# Patient Record
Sex: FEMALE | Race: BLACK OR AFRICAN AMERICAN | Employment: UNEMPLOYED | URBAN - METROPOLITAN AREA
[De-identification: names, ages, dates, MRNs, and addresses within clinical notes are randomized per-mention and may not be internally consistent; named-entity substitution may affect disease eponyms.]

---

## 2017-10-20 ENCOUNTER — HOSPITAL ENCOUNTER (OUTPATIENT)
Dept: LAB | Age: 62
Discharge: HOME | End: 2017-10-20

## 2017-10-20 ENCOUNTER — OFFICE VISIT (OUTPATIENT)
Dept: FAMILY MEDICINE CLINIC | Age: 62
End: 2017-10-20

## 2017-10-20 VITALS
WEIGHT: 181 LBS | TEMPERATURE: 98.9 F | HEIGHT: 60 IN | DIASTOLIC BLOOD PRESSURE: 80 MMHG | HEART RATE: 78 BPM | SYSTOLIC BLOOD PRESSURE: 145 MMHG | BODY MASS INDEX: 35.53 KG/M2

## 2017-10-20 DIAGNOSIS — E66.3 OVERWEIGHT: ICD-10-CM

## 2017-10-20 DIAGNOSIS — Z13.220 SCREENING FOR CHOLESTEROL LEVEL: ICD-10-CM

## 2017-10-20 DIAGNOSIS — Z23 ENCOUNTER FOR IMMUNIZATION: ICD-10-CM

## 2017-10-20 DIAGNOSIS — M54.5 BILATERAL LOW BACK PAIN, UNSPECIFIED CHRONICITY, WITH SCIATICA PRESENCE UNSPECIFIED: Primary | ICD-10-CM

## 2017-10-20 LAB
ALBUMIN SERPL-MCNC: 3.8 G/DL (ref 3.5–5)
ALBUMIN/GLOB SERPL: 1 {RATIO} (ref 1.1–2.2)
ALP SERPL-CCNC: 148 U/L (ref 45–117)
ALT SERPL-CCNC: 52 U/L (ref 12–78)
ANION GAP SERPL CALC-SCNC: 6 MMOL/L (ref 5–15)
AST SERPL-CCNC: 38 U/L (ref 15–37)
BILIRUB SERPL-MCNC: 0.5 MG/DL (ref 0.2–1)
BUN SERPL-MCNC: 9 MG/DL (ref 6–20)
BUN/CREAT SERPL: 14 (ref 12–20)
CALCIUM SERPL-MCNC: 10.8 MG/DL (ref 8.5–10.1)
CHLORIDE SERPL-SCNC: 106 MMOL/L (ref 97–108)
CHOLEST SERPL-MCNC: 307 MG/DL
CO2 SERPL-SCNC: 28 MMOL/L (ref 21–32)
CREAT SERPL-MCNC: 0.64 MG/DL (ref 0.55–1.02)
EST. AVERAGE GLUCOSE BLD GHB EST-MCNC: 171 MG/DL
GLOBULIN SER CALC-MCNC: 3.9 G/DL (ref 2–4)
GLUCOSE SERPL-MCNC: 156 MG/DL (ref 65–100)
HBA1C MFR BLD: 7.6 % (ref 4.2–6.3)
HDLC SERPL-MCNC: 54 MG/DL
HDLC SERPL: 5.7 {RATIO} (ref 0–5)
LDLC SERPL CALC-MCNC: 220.8 MG/DL (ref 0–100)
LIPID PROFILE,FLP: ABNORMAL
POTASSIUM SERPL-SCNC: 4.1 MMOL/L (ref 3.5–5.1)
PROT SERPL-MCNC: 7.7 G/DL (ref 6.4–8.2)
SODIUM SERPL-SCNC: 140 MMOL/L (ref 136–145)
TRIGL SERPL-MCNC: 161 MG/DL (ref ?–150)
TSH SERPL DL<=0.05 MIU/L-ACNC: 1.86 UIU/ML (ref 0.36–3.74)
VLDLC SERPL CALC-MCNC: 32.2 MG/DL

## 2017-10-20 PROCEDURE — 80061 LIPID PANEL: CPT | Performed by: PHYSICIAN ASSISTANT

## 2017-10-20 PROCEDURE — 84443 ASSAY THYROID STIM HORMONE: CPT | Performed by: PHYSICIAN ASSISTANT

## 2017-10-20 PROCEDURE — 83036 HEMOGLOBIN GLYCOSYLATED A1C: CPT | Performed by: PHYSICIAN ASSISTANT

## 2017-10-20 PROCEDURE — 80053 COMPREHEN METABOLIC PANEL: CPT | Performed by: PHYSICIAN ASSISTANT

## 2017-10-20 NOTE — PATIENT INSTRUCTIONS
Back Pain: Care Instructions  Your Care Instructions    Back pain has many possible causes. It is often related to problems with muscles and ligaments of the back. It may also be related to problems with the nerves, discs, or bones of the back. Moving, lifting, standing, sitting, or sleeping in an awkward way can strain the back. Sometimes you don't notice the injury until later. Arthritis is another common cause of back pain. Although it may hurt a lot, back pain usually improves on its own within several weeks. Most people recover in 12 weeks or less. Using good home treatment and being careful not to stress your back can help you feel better sooner. Follow-up care is a key part of your treatment and safety. Be sure to make and go to all appointments, and call your doctor if you are having problems. Its also a good idea to know your test results and keep a list of the medicines you take. How can you care for yourself at home? · Sit or lie in positions that are most comfortable and reduce your pain. Try one of these positions when you lie down:  ¨ Lie on your back with your knees bent and supported by large pillows. ¨ Lie on the floor with your legs on the seat of a sofa or chair. Josephus Phlegm on your side with your knees and hips bent and a pillow between your legs. ¨ Lie on your stomach if it does not make pain worse. · Do not sit up in bed, and avoid soft couches and twisted positions. Bed rest can help relieve pain at first, but it delays healing. Avoid bed rest after the first day of back pain. · Change positions every 30 minutes. If you must sit for long periods of time, take breaks from sitting. Get up and walk around, or lie in a comfortable position. · Try using a heating pad on a low or medium setting for 15 to 20 minutes every 2 or 3 hours. Try a warm shower in place of one session with the heating pad. · You can also try an ice pack for 10 to 15 minutes every 2 to 3 hours.  Put a thin cloth between the ice pack and your skin. · Take pain medicines exactly as directed. ¨ If the doctor gave you a prescription medicine for pain, take it as prescribed. ¨ If you are not taking a prescription pain medicine, ask your doctor if you can take an over-the-counter medicine. · Take short walks several times a day. You can start with 5 to 10 minutes, 3 or 4 times a day, and work up to longer walks. Walk on level surfaces and avoid hills and stairs until your back is better. · Return to work and other activities as soon as you can. Continued rest without activity is usually not good for your back. · To prevent future back pain, do exercises to stretch and strengthen your back and stomach. Learn how to use good posture, safe lifting techniques, and proper body mechanics. When should you call for help? Call your doctor now or seek immediate medical care if:  · You have new or worsening numbness in your legs. · You have new or worsening weakness in your legs. (This could make it hard to stand up.)  · You lose control of your bladder or bowels. Watch closely for changes in your health, and be sure to contact your doctor if:  · Your pain gets worse. · You are not getting better after 2 weeks. Where can you learn more? Go to http://april-judson.info/. Enter H661 in the search box to learn more about \"Back Pain: Care Instructions. \"  Current as of: March 21, 2017  Content Version: 11.3  © 6155-0122 [employer]. Care instructions adapted under license by [employer] (which disclaims liability or warranty for this information). If you have questions about a medical condition or this instruction, always ask your healthcare professional. [address] any warranty or liability for your use of this information. Back Stretches: Exercises  Your Care Instructions  Here are some examples of exercises for stretching your back.  Start each exercise slowly. Ease off the exercise if you start to have pain. Your doctor or physical therapist will tell you when you can start these exercises and which ones will work best for you. How to do the exercises  Overhead stretch    1. Stand comfortably with your feet shoulder-width apart. 2. Looking straight ahead, raise both arms over your head and reach toward the ceiling. Do not allow your head to tilt back. 3. Hold for 15 to 30 seconds, then lower your arms to your sides. 4. Repeat 2 to 4 times. Side stretch    1. Stand comfortably with your feet shoulder-width apart. 2. Raise one arm over your head, and then lean to the other side. 3. Slide your hand down your leg as you let the weight of your arm gently stretch your side muscles. Hold for 15 to 30 seconds. 4. Repeat 2 to 4 times on each side. Press-up    1. Lie on your stomach, supporting your body with your forearms. 2. Press your elbows down into the floor to raise your upper back. As you do this, relax your stomach muscles and allow your back to arch without using your back muscles. As your press up, do not let your hips or pelvis come off the floor. 3. Hold for 15 to 30 seconds, then relax. 4. Repeat 2 to 4 times. Relax and rest    1. Lie on your back with a rolled towel under your neck and a pillow under your knees. Extend your arms comfortably to your sides. 2. Relax and breathe normally. 3. Remain in this position for about 10 minutes. 4. If you can, do this 2 or 3 times each day. Follow-up care is a key part of your treatment and safety. Be sure to make and go to all appointments, and call your doctor if you are having problems. It's also a good idea to know your test results and keep a list of the medicines you take. Where can you learn more? Go to http://april-judson.info/. Enter R110 in the search box to learn more about \"Back Stretches: Exercises. \"  Current as of: March 21, 2017  Content Version: 11.3  © 8025-1331 [employer]. Care instructions adapted under license by [employer] (which disclaims liability or warranty for this information). If you have questions about a medical condition or this instruction, always ask your healthcare professional. [address] any warranty or liability for your use of this information.

## 2017-10-20 NOTE — MR AVS SNAPSHOT
Visit Information Date & Time Provider Department Dept. Phone Encounter #  
 10/20/2017  9:30 AM Kaushik Malhotra 89, [place] [facility] Marna Fabry [phone] [id number] Follow-up Instructions Return in about 2 months (around 12/20/2017), or if symptoms worsen or fail to improve. Upcoming Health Maintenance Date Due DTaP/Tdap/Td series (1 - Tdap) 1/9/1975 FOBT Q 1 YEAR AGE 50-75 1/9/2004 ZOSTER VACCINE AGE 60> 11/9/2013 INFLUENZA AGE 9 TO ADULT 8/1/2017 BREAST CANCER SCRN MAMMOGRAM 10/20/2019 PAP AKA CERVICAL CYTOLOGY 10/20/2020 Allergies as of 10/20/2017  Review Complete On: 10/20/2017 By: Geovanni Beyer No Known Allergies Current Immunizations  Never Reviewed No immunizations on file. Not reviewed this visit You Were Diagnosed With   
  
 Codes Comments Bilateral low back pain, unspecified chronicity, with sciatica presence unspecified    -  Primary ICD-10-CM: M54.5 ICD-9-CM: 724.2 Overweight     ICD-10-CM: N92.4 ICD-9-CM: 278.02 Screening for cholesterol level     ICD-10-CM: Z13.220 ICD-9-CM: V77.91 Vitals BP Pulse Temp Height(growth percentile) Weight(growth percentile) BMI  
 145/80 (BP 1 Location: Right arm, BP Patient Position: Sitting) 78 99.1 °F (37.3 °C) (Oral) 5' 0.04\" (1.525 m) 181 lb (82.1 kg) 35.3 kg/m2 OB Status Smoking Status Postmenopausal Never Smoker Vitals History BMI and BSA Data Body Mass Index Body Surface Area  
 35.3 kg/m 2 1.86 m 2 Your Updated Medication List  
  
Notice  As of 10/20/2017 10:39 AM  
 You have not been prescribed any medications. We Performed the Following TN COLLECTION VENOUS BLOOD,VENIPUNCTURE [id number] CPT(R)] Follow-up Instructions Return in about 2 months (around 12/20/2017), or if symptoms worsen or fail to improve. To-Do List   
 10/20/2017   Imaging:  XR SPINE LUMB 2 OR 3 V   
  
  
 Patient Instructions Back Pain: Care Instructions Your Care Instructions Back pain has many possible causes. It is often related to problems with muscles and ligaments of the back. It may also be related to problems with the nerves, discs, or bones of the back. Moving, lifting, standing, sitting, or sleeping in an awkward way can strain the back. Sometimes you don't notice the injury until later. Arthritis is another common cause of back pain. Although it may hurt a lot, back pain usually improves on its own within several weeks. Most people recover in 12 weeks or less. Using good home treatment and being careful not to stress your back can help you feel better sooner. Follow-up care is a key part of your treatment and safety. Be sure to make and go to all appointments, and call your doctor if you are having problems. Its also a good idea to know your test results and keep a list of the medicines you take. How can you care for yourself at home? · Sit or lie in positions that are most comfortable and reduce your pain. Try one of these positions when you lie down: ¨ Lie on your back with your knees bent and supported by large pillows. ¨ Lie on the floor with your legs on the seat of a sofa or chair. Duayne Lark on your side with your knees and hips bent and a pillow between your legs. ¨ Lie on your stomach if it does not make pain worse. · Do not sit up in bed, and avoid soft couches and twisted positions. Bed rest can help relieve pain at first, but it delays healing. Avoid bed rest after the first day of back pain. · Change positions every 30 minutes. If you must sit for long periods of time, take breaks from sitting. Get up and walk around, or lie in a comfortable position. · Try using a heating pad on a low or medium setting for 15 to 20 minutes every 2 or 3 hours. Try a warm shower in place of one session with the heating pad. · You can also try an ice pack for 10 to 15 minutes every 2 to 3 hours. Put a thin cloth between the ice pack and your skin. · Take pain medicines exactly as directed. ¨ If the doctor gave you a prescription medicine for pain, take it as prescribed. ¨ If you are not taking a prescription pain medicine, ask your doctor if you can take an over-the-counter medicine. · Take short walks several times a day. You can start with 5 to 10 minutes, 3 or 4 times a day, and work up to longer walks. Walk on level surfaces and avoid hills and stairs until your back is better. · Return to work and other activities as soon as you can. Continued rest without activity is usually not good for your back. · To prevent future back pain, do exercises to stretch and strengthen your back and stomach. Learn how to use good posture, safe lifting techniques, and proper body mechanics. When should you call for help? Call your doctor now or seek immediate medical care if: 
· You have new or worsening numbness in your legs. · You have new or worsening weakness in your legs. (This could make it hard to stand up.) · You lose control of your bladder or bowels. Watch closely for changes in your health, and be sure to contact your doctor if: 
· Your pain gets worse. · You are not getting better after 2 weeks. Where can you learn more? Go to http://april-judson.info/. Enter Q022 in the search box to learn more about \"Back Pain: Care Instructions. \" Current as of: March 21, 2017 Content Version: 11.3 © 2150-8046 [employer]. Care instructions adapted under license by [employer] (which disclaims liability or warranty for this information). If you have questions about a medical condition or this instruction, always ask your healthcare professional. [address] any warranty or liability for your use of this information. Back Stretches: Exercises Your Care Instructions Here are some examples of exercises for stretching your back. Start each exercise slowly. Ease off the exercise if you start to have pain. Your doctor or physical therapist will tell you when you can start these exercises and which ones will work best for you. How to do the exercises Overhead stretch 1. Stand comfortably with your feet shoulder-width apart. 2. Looking straight ahead, raise both arms over your head and reach toward the ceiling. Do not allow your head to tilt back. 3. Hold for 15 to 30 seconds, then lower your arms to your sides. 4. Repeat 2 to 4 times. Side stretch 1. Stand comfortably with your feet shoulder-width apart. 2. Raise one arm over your head, and then lean to the other side. 3. Slide your hand down your leg as you let the weight of your arm gently stretch your side muscles. Hold for 15 to 30 seconds. 4. Repeat 2 to 4 times on each side. Press-up 1. Lie on your stomach, supporting your body with your forearms. 2. Press your elbows down into the floor to raise your upper back. As you do this, relax your stomach muscles and allow your back to arch without using your back muscles. As your press up, do not let your hips or pelvis come off the floor. 3. Hold for 15 to 30 seconds, then relax. 4. Repeat 2 to 4 times. Relax and rest 
 
1. Lie on your back with a rolled towel under your neck and a pillow under your knees. Extend your arms comfortably to your sides. 2. Relax and breathe normally. 3. Remain in this position for about 10 minutes. 4. If you can, do this 2 or 3 times each day. Follow-up care is a key part of your treatment and safety. Be sure to make and go to all appointments, and call your doctor if you are having problems. It's also a good idea to know your test results and keep a list of the medicines you take. Where can you learn more? Go to http://april-judson.info/. Enter L651 in the search box to learn more about \"Back Stretches: Exercises. \" Current as of: March 21, 2017 Content Version: 11.3 © 7992-9070 [employer]. Care instructions adapted under license by [employer] (which disclaims liability or warranty for this information). If you have questions about a medical condition or this instruction, always ask your healthcare professional. [address] any warranty or liability for your use of this information. Introducing [facility]! [employer] introduces Affectv patient portal. Now you can access parts of your medical record, email your doctor's office, and request medication refills online. 1. In your internet browser, go to [email or web address] 2. Click on the First Time User? Click Here link in the Sign In box. You will see the New Member Sign Up page. 3. Enter your Affectv Access Code exactly as it appears below. You will not need to use this code after youve completed the sign-up process. If you do not sign up before the expiration date, you must request a new code. · Affectv Access Code: [email or web address] Expires: 1/18/2018 10:07 AM 
 
4. Enter the last four digits of your Social Security Number (xxxx) and Date of Birth (mm/dd/yyyy) as indicated and click Submit. You will be taken to the next sign-up page. 5. Create a Affectv ID. This will be your Affectv login ID and cannot be changed, so think of one that is secure and easy to remember. 6. Create a Affectv password. You can change your password at any time. 7. Enter your Password Reset Question and Answer. This can be used at a later time if you forget your password. 8. Enter your e-mail address. You will receive e-mail notification when new information is available in [address] 9. Click Sign Up. You can now view and download portions of your medical record. 10. Click the Download Summary menu link to download a portable copy of your medical information. If you have questions, please visit the Frequently Asked Questions section of the StormPins website. Remember, StormPins is NOT to be used for urgent needs. For medical emergencies, dial 911. Now available from your iPhone and Android! Please provide this summary of care documentation to your next provider. If you have any questions after today's visit, please call [phone].

## 2017-10-20 NOTE — PROGRESS NOTES
Assessment/Plan:    Diagnoses and all orders for this visit:    1. Bilateral low back pain, unspecified chronicity, with sciatica presence unspecified  -     XR SPINE LUMB 2 OR 3 V; Future  -     MO COLLECTION VENOUS BLOOD,VENIPUNCTURE    2. Overweight  -     HEMOGLOBIN A1C WITH EAG; Future  -     TSH 3RD GENERATION; Future  -     METABOLIC PANEL, COMPREHENSIVE; Future    3. Screening for cholesterol level  -     LIPID PANEL; Future        Follow-up Disposition:  Return in about 2 months (around 12/20/2017), or if symptoms worsen or fail to improve. CHIKIS Ledezma expressed understanding of this plan. An AVS was printed and given to the patient.      ----------------------------------------------------------------------    Chief Complaint   Patient presents with    Physical     General Exam  +  Blood work       History of Present Illness:  61 yo here for first appt. She has a problem with julius low back pain. Worse with standing and walking and pain is decreased with bending at the waist forward. She states that she has gained a lot of weight since moving to the [address] [place] [address] from [facility] 2 years ago. She thinks that the problem is that she is not working, hasn't been able to find a job. She has no chronic medical problems  She is not on any current medications  She has no known allergies      No past medical history on file. No Known Allergies    Social History   Substance Use Topics    Smoking status: Never Smoker    Smokeless tobacco: Not on file    Alcohol use No       No family history on file. Physical Exam:     Visit Vitals    /80 (BP 1 Location: Right arm, BP Patient Position: Sitting)    Pulse 78    Temp 98.9 °F (37.2 °C) (Oral)    Ht 5' 0.04\" (1.525 m)    Wt 181 lb (82.1 kg)    BMI 35.3 kg/m2   gen pleasant, looks well    A&Ox3  WDWN NAD  Respirations normal and non labored  MSK exam- tender julius paraspinal muscles lumbar spine. SLR neg julius.  She has general in flexibility due to no exercise. She has relief of pain with she has forward bend at waist. Neuro intact down to toes.  We went over some gentle exercises to help her back which she states made her feel better

## 2017-10-20 NOTE — PROGRESS NOTES
No need to use . AVS given and reviewed. [facility] resource sheet given and instructed patient to call. Information on how to get x-ray and where to go discussed with patient. Care card information sheet given and discussed with patient. Pt aware that she must return to see the health provider in 2 months for follow up or sooner  or if symptoms worsen or fail to improve. No allergies to medications or foods, and no adverse reactions to previous vaccines per patient. Influenza vaccine given per protocol, schedule and provider order. Vaccine given without complications Entered in [employer]. VIS statement given and reviewed. Potential side effects reviewed. Reviewed reasons to seek emergency assistance. Patient verbalized understanding. Patient waited in the clinic for 15 minutes, patient did not show s/s of allergic reaction at time of discharge. Directed patient to registration to make f/u appt. Patient verbalized understanding . No questions or concern from patient at this time.  Lanny Ferreira RN

## 2017-10-23 DIAGNOSIS — E11.9 DM TYPE 2, GOAL HBA1C < 7% (HCC): Primary | ICD-10-CM

## 2017-10-23 NOTE — PROGRESS NOTES
I spoke to the pt on the phone at length about her new DM dx. She will start with lifestyle changes - diet and increase her activity. Please send her some info in English about Diabetes. [facility], please schedule her next available with dietician.  Thank you team!

## 2017-11-08 ENCOUNTER — OFFICE VISIT (OUTPATIENT)
Dept: FAMILY MEDICINE CLINIC | Age: 62
End: 2017-11-08

## 2017-11-08 DIAGNOSIS — Z71.3 DIETARY COUNSELING AND SURVEILLANCE: Primary | ICD-10-CM

## 2017-11-08 NOTE — PROGRESS NOTES
Nutrition Initial Visit    Muna Awan was seen for nutrition education relating to T2DM. Pt A1C 10/20/17- 7.6. Explained that A1C reflects blood sugar over a three-month period and is an indication of blood sugar control over time. Pt reports typically eating 2x/day. AM  Cream of wheat w/ splenda and 2% milk    Mid Day  Vegetables (broccoli/collards/cabbage)  Boiled chicken/fish  Sometimes small portion of brown rice    Pt reports drinking mostly water, occasionally orange juice diluted with water. Pt has cut out sweets from her diet. An alternative breakfast includes wheat bread with velasco or an egg. Emphasized the importance of protein at each meal, and snack especially if eating fruit or another carb food snack. Provided Myplate handout and discussed ideal portioning for blood sugar control. Suggested that pt try to eat more frequent meals to keep blood sugar stable throughout the day. Pt reported walking for exercise about 3x/week. Pt says it has helped with her back pain.

## 2017-12-15 ENCOUNTER — OFFICE VISIT (OUTPATIENT)
Dept: FAMILY MEDICINE CLINIC | Age: 62
End: 2017-12-15

## 2017-12-15 VITALS
TEMPERATURE: 98.4 F | HEART RATE: 88 BPM | BODY MASS INDEX: 33.94 KG/M2 | DIASTOLIC BLOOD PRESSURE: 83 MMHG | WEIGHT: 174 LBS | SYSTOLIC BLOOD PRESSURE: 134 MMHG

## 2017-12-15 DIAGNOSIS — E11.9 DM TYPE 2, GOAL HBA1C < 7% (HCC): Primary | ICD-10-CM

## 2017-12-15 LAB — GLUCOSE POC: NORMAL MG/DL

## 2017-12-15 NOTE — PROGRESS NOTES
Assessment/Plan:    Diagnoses and all orders for this visit:    1. DM type 2, goal HbA1c < 7% ([facility])  -     AMB POC GLUCOSE BLOOD, BY GLUCOSE MONITORING DEVICE    Pt is making incredible changes to her lifestyle and feels great  Plan to f/up with labs in 2/18 and appt 2 weeks later    Follow-up Disposition: Not on [address] \[facility] [place] [facility]\"", CHIKIS Barber expressed understanding of this plan. An AVS was printed and given to the patient.      ----------------------------------------------------------------------    Chief Complaint   Patient presents with    Blood sugar problem       History of Present Illness:  Pt here for f/up on recently dx'd DM. She is feeling great and has managed to lose about 7 lbs with diet and exercise changes. She has eliminated all sodas from her diet, all sugary foods and many starchy foods. We reviewed the rec's from dietician about getting more protein with her meals and she is working on this too. 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 No past medical history on file. No Known Allergies    Social History   Substance Use Topics    Smoking status: Never Smoker    Smokeless tobacco: Never Used    Alcohol use No       No family history on file.     Physical Exam:     Visit Vitals    /83 (BP 1 Location: Right arm, BP Patient Position: Sitting)    Pulse 88    Temp 98.4 °F (36.9 °C) (Oral)    Wt 174 lb (78.9 kg)    BMI 33.94 kg/m2       A&Ox3  WDWN NAD  Respirations normal and non labored

## 2018-02-16 ENCOUNTER — HOSPITAL ENCOUNTER (OUTPATIENT)
Dept: LAB | Age: 63
Discharge: HOME | End: 2018-02-16

## 2018-02-16 ENCOUNTER — CLINICAL SUPPORT (OUTPATIENT)
Dept: FAMILY MEDICINE CLINIC | Age: 63
End: 2018-02-16

## 2018-02-16 DIAGNOSIS — E11.9 DM TYPE 2, GOAL HBA1C < 7% (HCC): ICD-10-CM

## 2018-02-16 LAB
EST. AVERAGE GLUCOSE BLD GHB EST-MCNC: 154 MG/DL
HBA1C MFR BLD: 7 % (ref 4.2–6.3)

## 2018-02-16 PROCEDURE — 83036 HEMOGLOBIN GLYCOSYLATED A1C: CPT | Performed by: PHYSICIAN ASSISTANT

## 2018-02-16 NOTE — PROGRESS NOTES
Patient presents for lab draw ordered by: Nazanin Marion    The following labs were drawn Carole Cruz LPN    [id number]    The following tubes were sent:  Lavender  ( 1)      Pt was unable to leave a urine sample today. She will make an appt to drop off urine.

## 2018-03-02 ENCOUNTER — HOSPITAL ENCOUNTER (OUTPATIENT)
Dept: LAB | Age: 63
Discharge: HOME | End: 2018-03-02

## 2018-03-02 ENCOUNTER — OFFICE VISIT (OUTPATIENT)
Dept: FAMILY MEDICINE CLINIC | Age: 63
End: 2018-03-02

## 2018-03-02 DIAGNOSIS — M54.5 BILATERAL LOW BACK PAIN, UNSPECIFIED CHRONICITY, WITH SCIATICA PRESENCE UNSPECIFIED: ICD-10-CM

## 2018-03-02 DIAGNOSIS — E11.9 DM TYPE 2, GOAL HBA1C < 7% (HCC): Primary | ICD-10-CM

## 2018-03-02 LAB
CREAT UR-MCNC: 170 MG/DL
MICROALBUMIN UR-MCNC: 1.48 MG/DL
MICROALBUMIN/CREAT UR-RTO: 9 MG/G (ref 0–30)

## 2018-03-02 PROCEDURE — 82043 UR ALBUMIN QUANTITATIVE: CPT | Performed by: PHYSICIAN ASSISTANT

## 2018-03-02 PROCEDURE — 82570 ASSAY OF URINE CREATININE: CPT | Performed by: PHYSICIAN ASSISTANT

## 2018-04-20 ENCOUNTER — OFFICE VISIT (OUTPATIENT)
Dept: FAMILY MEDICINE CLINIC | Age: 63
End: 2018-04-20

## 2018-04-20 ENCOUNTER — HOSPITAL ENCOUNTER (OUTPATIENT)
Dept: LAB | Age: 63
Discharge: HOME | End: 2018-04-20

## 2018-04-20 VITALS
HEART RATE: 81 BPM | BODY MASS INDEX: 33.81 KG/M2 | TEMPERATURE: 98.5 F | WEIGHT: 172.2 LBS | HEIGHT: 60 IN | SYSTOLIC BLOOD PRESSURE: 149 MMHG | DIASTOLIC BLOOD PRESSURE: 91 MMHG

## 2018-04-20 DIAGNOSIS — E11.9 DM TYPE 2, GOAL HBA1C < 7% (HCC): Primary | ICD-10-CM

## 2018-04-20 DIAGNOSIS — E83.52 SERUM CALCIUM ELEVATED: ICD-10-CM

## 2018-04-20 LAB
25(OH)D3 SERPL-MCNC: 21.5 NG/ML (ref 30–100)
GLUCOSE POC: NORMAL MG/DL

## 2018-04-20 PROCEDURE — 82330 ASSAY OF CALCIUM: CPT | Performed by: PHYSICIAN ASSISTANT

## 2018-04-20 PROCEDURE — 82306 VITAMIN D 25 HYDROXY: CPT | Performed by: PHYSICIAN ASSISTANT

## 2018-04-20 NOTE — MR AVS SNAPSHOT
[address] [place] [address] [address] 
[phone] Patient: Cookie Wade MRN: [id number] Visit Information Date & Time Provider Department Dept. Phone Encounter #  
 2018  2:00 PM Duane Maclachlan [address], [place] [facility] X[facility] Denise [phone] [id number] Follow-up Instructions Return in about 3 months (around 2018). Upcoming Health Maintenance Date Due Hepatitis C Screening 1955 FOOT EXAM Q1 1964 EYE EXAM RETINAL OR DILATED Q1 1964 Pneumococcal 19-64 Medium Risk (1 of 1 - PPSV23) 1973 DTaP/Tdap/Td series (1 - Tdap) 1975 FOBT Q 1 YEAR AGE 50-75 2004 ZOSTER VACCINE AGE 60> 2013 HEMOGLOBIN A1C Q6M 2018 LIPID PANEL Q1 10/20/2018 MICROALBUMIN Q1 3/2/2019 BREAST CANCER SCRN MAMMOGRAM 10/20/2019 PAP AKA CERVICAL CYTOLOGY 10/20/2020 Allergies as of 2018  Review Complete On: 10/20/2017 By: Naomi Castro RN No Known Allergies Current Immunizations  Reviewed on 10/20/2017 Name Date Influenza Vaccine (Quad) PF 10/20/2017 Not reviewed this visit You Were Diagnosed With   
  
 Codes Comments DM type 2, goal HbA1c < 7% (HCC)    -  Primary ICD-10-CM: E11.9 ICD-9-CM: 250.00 Serum calcium elevated     ICD-10-CM: E83.52 
ICD-9-CM: 275.42 Vitals BP Pulse Temp Height(growth percentile) Weight(growth percentile) BMI  
 (!) 149/91 (BP 1 Location: Right arm) 81 98.5 °F (36.9 °C) (Oral) 4' 11.65\" (1.515 m) 172 lb 3.2 oz (78.1 kg) 34.03 kg/m2 OB Status Smoking Status Postmenopausal Never Smoker Vitals History BMI and BSA Data Body Mass Index Body Surface Area 34.03 kg/m 2 1.81 m 2 Preferred Pharmacy Pharmacy Name Phone [facility] [address] [facility], [address] [facility] [phone] Your Updated Medication List  
  
 Notice  As of 4/20/2018  3:07 PM  
 You have not been prescribed any medications. We Performed the Following AMB POC GLUCOSE BLOOD, BY GLUCOSE MONITORING DEVICE [09455 CPT(R)] Follow-up Instructions Return in about 3 months (around 7/20/2018). Introducing [facility]! [employer] introduces [employer] patient portal. Now you can access parts of your medical record, email your doctor's office, and request medication refills online. 1. In your internet browser, go to [email or web address] 2. Click on the First Time User? Click Here link in the Sign In box. You will see the New Member Sign Up page. 3. Enter your Bit9 Access Code exactly as it appears below. You will not need to use this code after youve completed the sign-up process. If you do not sign up before the expiration date, you must request a new code. · Bit9 Access Code: [email or web address] Expires: 7/19/2018  3:07 PM 
 
4. Enter the last four digits of your Social Security Number (xxxx) and Date of Birth (mm/dd/yyyy) as indicated and click Submit. You will be taken to the next sign-up page. 5. Create a Bit9 ID. This will be your Bit9 login ID and cannot be changed, so think of one that is secure and easy to remember. 6. Create a Bit9 password. You can change your password at any time. 7. Enter your Password Reset Question and Answer. This can be used at a later time if you forget your password. 8. Enter your e-mail address. You will receive e-mail notification when new information is available in [address] 9. Click Sign Up. You can now view and download portions of your medical record. 10. Click the Download Summary menu link to download a portable copy of your medical information. If you have questions, please visit the Frequently Asked Questions section of the Bit9 website. Remember, Bit9 is NOT to be used for urgent needs. For medical emergencies, dial 911. Now available from your iPhone and Android! Please provide this summary of care documentation to your next provider. If you have any questions after today's visit, please call [phone].

## 2018-04-20 NOTE — PROGRESS NOTES
Assessment/Plan:    Diagnoses and all orders for this visit:    1. DM type 2, goal HbA1c < 7% ([facility])  -     AMB POC GLUCOSE BLOOD, BY GLUCOSE MONITORING DEVICE    Diet controlled DM 2. She is doing great, she has made considerable changes to her diet/lifestyle and her last a1c was down from 7.6 10/17 to 7.0 2/18. She will f/up in 8/18 for labs then one week later appt for results and recheck     Elevated calcium on lab 10/17: repeat calcium today then will recommend based on these results  Add vit D level to labs today  [facility] referral, asked pt to call for schedule for gyn care and mammogram     Follow-up Disposition:  Return in about 3 months (around 7/20/2018). CHIKIS Floyd expressed understanding of this plan. An AVS was printed and given to the patient.      ----------------------------------------------------------------------    Chief Complaint   Patient presents with    Follow-up     low back pain    Follow-up     diabetes-patient states she is borderline. History of Present Illness:  Pt here for scheduled f/up on diet controlled DM  She is not taking any medications  She has made huge changes to her diet- she is eating only brown rice, more veggies, more lean meats  She does not check her blood sugar at home  She stopped walking for exercise over the winter but is now picking it back up again  She denies the following: blurry vision, polyuria/polydipsia/ vaginal itching/ unexplained weight loss    She is planning a trip to [place] to watch her daughter graduate from nursing school next month  Another daughter won the [employer] and will be coming from [facility]      No past medical history on file. No Known Allergies    Social History   Substance Use Topics    Smoking status: Never Smoker    Smokeless tobacco: Never Used    Alcohol use No       No family history on file.     Physical Exam:     Visit Vitals    BP (!) 149/91 (BP 1 Location: Right arm)    Pulse 81    Temp 98.5 °F (36.9 °C) (Oral)    Ht 4' 11.65\" (1.515 m)    Wt 172 lb 3.2 oz (78.1 kg)    BMI 34.03 kg/m2       A&Ox3  WDWN NAD  Respirations normal and non labored  Lab Results   Component Value Date/Time    Cholesterol, total 307 (H) 10/20/2017 10:28 AM    HDL Cholesterol 54 10/20/2017 10:28 AM    LDL, calculated 220.8 (H) 10/20/2017 10:28 AM    VLDL, calculated 32.2 10/20/2017 10:28 AM    Triglyceride 161 (H) 10/20/2017 10:28 AM    CHOL/HDL Ratio 5.7 (H) 10/20/2017 10:28 AM     Lab Results   Component Value Date/Time    Hemoglobin A1c 7.0 (H) 02/16/2018 02:17 PM     Lab Results   Component Value Date/Time    Sodium 140 10/20/2017 10:28 AM    Potassium 4.1 10/20/2017 10:28 AM    Chloride 106 10/20/2017 10:28 AM    CO2 28 10/20/2017 10:28 AM    Anion gap 6 10/20/2017 10:28 AM    Glucose 156 (H) 10/20/2017 10:28 AM    BUN 9 10/20/2017 10:28 AM    Creatinine 0.64 10/20/2017 10:28 AM    BUN/Creatinine ratio 14 10/20/2017 10:28 AM    GFR est AA >60 10/20/2017 10:28 AM    GFR est non-AA >60 10/20/2017 10:28 AM    Calcium 10.8 (H) 10/20/2017 10:28 AM    Bilirubin, total 0.5 10/20/2017 10:28 AM    AST (SGOT) 38 (H) 10/20/2017 10:28 AM    Alk.  phosphatase 148 (H) 10/20/2017 10:28 AM    Protein, total 7.7 10/20/2017 10:28 AM    Albumin 3.8 10/20/2017 10:28 AM    Globulin 3.9 10/20/2017 10:28 AM    A-G Ratio 1.0 (L) 10/20/2017 10:28 AM    ALT (SGPT) 52 10/20/2017 10:28 AM     Lab Results   Component Value Date/Time    TSH 1.86 10/20/2017 10:28 AM

## 2018-04-20 NOTE — PROGRESS NOTES
Coordination of Care  1. Have you been to the ER, urgent care clinic since your last visit? Hospitalized since your last visit? No    2. Have you seen or consulted any other health care providers outside of the [address] [place] [address] since your last visit? Include any pap smears or colon screening. No    Does the patient need refills?  NO    Learning Assessment Complete? yes    Results for orders placed or performed in visit on 04/20/18   AMB POC GLUCOSE BLOOD, BY GLUCOSE MONITORING DEVICE   Result Value Ref Range    Glucose POC f 102 mg/dL

## 2018-04-20 NOTE — PROGRESS NOTES
Printed AVS, provided to pt and reviewed. Pt indicated understanding and had no questions. Provided pt with information and phone # for [facility]. Explained that they will do a financial screening before scheduling appt.  Melanie Negro RN

## 2018-04-23 LAB
Lab: NORMAL
REFERENCE LAB,REFLB: NORMAL
TEST DESCRIPTION:,ATST: NORMAL

## 2018-04-24 ENCOUNTER — TELEPHONE (OUTPATIENT)
Dept: ENDOCRINOLOGY | Age: 63
End: 2018-04-24

## 2018-04-24 DIAGNOSIS — E83.52 SERUM CALCIUM ELEVATED: ICD-10-CM

## 2018-04-24 DIAGNOSIS — E55.9 VITAMIN D DEFICIENCY: Primary | ICD-10-CM

## 2018-04-24 RX ORDER — CYANOCOBALAMIN (VITAMIN B-12) 500 MCG
800 TABLET ORAL DAILY
Qty: 60 TAB | Refills: 2 | Status: SHIPPED | OUTPATIENT
Start: 2018-04-24 | End: 2018-04-25

## 2018-04-24 NOTE — PROGRESS NOTES
Please note: I changed the rx to 400 international units 2 per day and recheck in 12 weeks.  Thank you

## 2018-04-24 NOTE — PROGRESS NOTES
I have called pt and left her this message: 1. She needs to start on vit d replacement weekly meds for 8 weeks then recheck level  2. I would like for her to call [facility] for a mammogram, the number is [phone].  3. I need to do additional testing for your elevated calcium, please come in for additional blood work   Would you please call her to make sure she has received my message?  Thank you

## 2018-04-25 DIAGNOSIS — E83.52 SERUM CALCIUM ELEVATED: Primary | ICD-10-CM

## 2018-04-25 NOTE — TELEPHONE ENCOUNTER
----- Message from Moni [place] sent at 4/24/2018  2:41 PM EDT -----  Regarding: elevated serum and ionized calcium  Good afternoon,  Would you please advise on testing for a 59 yo diet controlled DM 2, not on any medication (although I am asking her to start vit d replacement), who had a random serum calcium of 10.8 (that I think I missed) in fall 2017. I saw her in clinic yesterday and did an ionized calcium which came back mildly elevated at 5.9. I have asked her to come back for PTH and to make an appt for mammogram.  Would you recommend any additional labs at this time? Or wait for PTH and then decide if more are needed? Thank you,  Stephen Marie     ----- Message -----     From: Silas, [facility] [place]     Sent: 4/20/2018   9:57 PM       To:  MARTHA Bourne

## 2018-04-25 NOTE — TELEPHONE ENCOUNTER
[place] Due,    I would not start vitamin D supplementation as her low level is likely an appropriate response to her high calcium so that she doesn't absorb more calcium from her diet. I would recommend drawing a PTH with repeat calcium level and if this is elevated, then she will need a parathyroid scan to look for a parathyroid adenoma as the cause of the higher calcium. I don't think other tests are needed yet. Let me know if you need further help with her.     Cyndi Amaya

## 2018-04-25 NOTE — PROGRESS NOTES
I got the pt on the phone this morning and told her to NOT start the vit d replacement (she did get my message yesterday but has not picked up the rx yet and I have discontinued it). I have asked her to come in for additional lab work and she can't come this week but will try to next week. I reminded her about the mammogram info through [facility] that I gave her yesterday and asked her to make this appt as well.  Will follow for lab results

## 2018-05-03 NOTE — PROGRESS NOTES
Telephone call made to the patient. She stated she had received the provider's phone message and will come on Saturday, 05-05-18, to [facility], to have the labs drawn. Lab appointment scheduled for 1pm and the orders are in [facility]. She stated she has started taking the Vitamin D, but has not called [facility] yet because she has limited transportation and needs the person who drives her to be with her when she calls so that they can coordinate scheduling an appointment.  Rosemarie Orozco RN

## 2018-05-05 ENCOUNTER — HOSPITAL ENCOUNTER (OUTPATIENT)
Dept: LAB | Age: 63
Discharge: HOME | End: 2018-05-05

## 2018-05-05 ENCOUNTER — LAB ONLY (OUTPATIENT)
Dept: FAMILY MEDICINE CLINIC | Age: 63
End: 2018-05-05

## 2018-05-05 DIAGNOSIS — E83.52 SERUM CALCIUM ELEVATED: ICD-10-CM

## 2018-05-05 LAB
ALBUMIN SERPL-MCNC: 4.1 G/DL (ref 3.5–5)
ALBUMIN/GLOB SERPL: 1.1 {RATIO} (ref 1.1–2.2)
ALP SERPL-CCNC: 136 U/L (ref 45–117)
ALT SERPL-CCNC: 35 U/L (ref 12–78)
ANION GAP SERPL CALC-SCNC: 7 MMOL/L (ref 5–15)
AST SERPL-CCNC: 22 U/L (ref 15–37)
BILIRUB SERPL-MCNC: 0.8 MG/DL (ref 0.2–1)
BUN SERPL-MCNC: 14 MG/DL (ref 6–20)
BUN/CREAT SERPL: 23 (ref 12–20)
CALCIUM SERPL-MCNC: 11 MG/DL (ref 8.5–10.1)
CHLORIDE SERPL-SCNC: 106 MMOL/L (ref 97–108)
CO2 SERPL-SCNC: 28 MMOL/L (ref 21–32)
CREAT SERPL-MCNC: 0.61 MG/DL (ref 0.55–1.02)
GLOBULIN SER CALC-MCNC: 3.8 G/DL (ref 2–4)
GLUCOSE SERPL-MCNC: 116 MG/DL (ref 65–100)
POTASSIUM SERPL-SCNC: 4.6 MMOL/L (ref 3.5–5.1)
PROT SERPL-MCNC: 7.9 G/DL (ref 6.4–8.2)
SODIUM SERPL-SCNC: 141 MMOL/L (ref 136–145)

## 2018-05-05 PROCEDURE — 80053 COMPREHEN METABOLIC PANEL: CPT | Performed by: PHYSICIAN ASSISTANT

## 2018-05-05 PROCEDURE — 83970 ASSAY OF PARATHORMONE: CPT | Performed by: PHYSICIAN ASSISTANT

## 2018-05-06 LAB
CALCIUM SERPL-MCNC: 11.1 MG/DL (ref 8.5–10.1)
PTH-INTACT SERPL-MCNC: 161.6 PG/ML (ref 18.4–88)

## 2018-05-07 DIAGNOSIS — E34.9 INCREASED PTH LEVEL: Primary | ICD-10-CM

## 2018-05-07 NOTE — PROGRESS NOTES
PTH increased  Repeat serum calcium remains elevated (11.1)  Vit d decreased at 21    Order parathyroid scan   Please schedule a f/up appt as well

## 2018-05-08 ENCOUNTER — TELEPHONE (OUTPATIENT)
Dept: FAMILY MEDICINE CLINIC | Age: 63
End: 2018-05-08

## 2018-05-08 NOTE — TELEPHONE ENCOUNTER
Tc to the pt to discuss the providers recommendations regarding her lab results. The pt needed to be scheduled for a Parathyroid scan. The pt's telephone number was busy. Will re-try. The number. Will Diehl RN    Pt called again. Pt answered the phone. Discussed the providers recommendations for her to have her parathyroid scanned. The pt was asked which [facility] facility she would like to be scheduled to go to. The pt stated she had to ask a friend who brings her which one is the closest to her. The pt was given all [facility] names and locations.  Will Diehl RN

## 2018-05-10 ENCOUNTER — TELEPHONE (OUTPATIENT)
Dept: FAMILY MEDICINE CLINIC | Age: 63
End: 2018-05-10

## 2018-05-10 NOTE — TELEPHONE ENCOUNTER
Tc to the pt she is requesting [facility] for Parathyroid scan in the afternoon next weekend. The pt was told will call and try to schedule this appt for her and call her back. Conor Trevino RN     Tc to central scheduling. No weekend appts and only appts are at 8am. First available appt Mon 05/14/18 at 8am at [facility] pt needs to arrive 30 min early. Requested to schedule the pt's scan for this appt day and time. Will call back and cancel and reschedule if pt cannot come this day. Conor Trevino RN    Tc to central scheduling. Cancelled the monday appt. The  was having difficulty scheduling th ept for Wed and asked if she could call this nurse back. Phone number given to  to call back. Conor Trevino RN    Tc from the . Scheduled the appt for the pt Wed 05-16-18 at [facility]. Pt told to arrive at 7:30am to Outpatient registration. Bring photo id. Ask about the Care Card. Pt stated she already knows about the care card. Pt told to show the care card to the registrar if she has one. Pt will have the dose of Dye at 8am, the scan will be at 10 am and her ultra sound is scheduled at 2 pm. The pt was told she could go get lunch in between the scan and the ultra sound. Pt denies any questions and verbalized understanding.  Conor Trevino RN

## 2018-05-16 ENCOUNTER — HOSPITAL ENCOUNTER (OUTPATIENT)
Dept: NUCLEAR MEDICINE | Age: 63
Discharge: HOME | End: 2018-05-16
Payer: SELF-PAY

## 2018-05-16 ENCOUNTER — HOSPITAL ENCOUNTER (OUTPATIENT)
Dept: ULTRASOUND IMAGING | Age: 63
Discharge: HOME | End: 2018-05-16
Payer: SELF-PAY

## 2018-05-16 DIAGNOSIS — E34.9 INCREASED PTH LEVEL: ICD-10-CM

## 2018-05-16 PROCEDURE — 78070 PARATHYROID PLANAR IMAGING: CPT

## 2018-05-16 PROCEDURE — 76536 US EXAM OF HEAD AND NECK: CPT

## 2018-05-17 NOTE — PROGRESS NOTES
Given her PTH is very elevated at 161 and repeat calcium was high at 11.1, this is almost certainly primary hyperparathyroidism. The problem is that there is a 12-25% false negative rate for parathyroid scans. With these patients, I usually order a CT scan of the neck with contrast to look for parathyroid adenomas that may not be in the typical location and can be in the tracheoesophageal groove or under the manubrium. Part of this depends on how symptomatic she is to consider pushing for this test as I would only do this if she has a history of kidney stones or a lot of fatigue or bone pain to suggest that she is very symptomatic from this condition such that she would benefit from surgery. If she is not symptomatic, I would hold on pursuing this test as long as her calcium is around 11.3 or less. Have her drink at least 4 8oz glasses of water per day to stay hydrated to prevent her calcium from going higher and she can have repeat metabolic panels every 4-6 months to follow  her levels over time.

## 2018-05-17 NOTE — PROGRESS NOTES
Dr Denisse Davidson,  Pt had negative parathyroid scan and neg thyroid ultrasound. Would you advise on further testing?  Thank you, Latonya Fernandes

## 2018-05-22 ENCOUNTER — DOCUMENTATION ONLY (OUTPATIENT)
Dept: FAMILY MEDICINE CLINIC | Age: 63
End: 2018-05-22

## 2018-05-22 NOTE — PROGRESS NOTES
T/c to the pt to discuss the results of her thyroid ultrasound and scan. She has never had kidney stones, nor is she having bone pain or fatigue. We discussed staying well hydrated and that she should f/up in 4-6 months or sooner PRN.  She agrees to this plan

## 2020-08-25 ENCOUNTER — VIRTUAL VISIT (OUTPATIENT)
Dept: FAMILY MEDICINE CLINIC | Age: 65
End: 2020-08-25

## 2020-08-25 DIAGNOSIS — E11.65 TYPE 2 DIABETES MELLITUS WITH HYPERGLYCEMIA, WITHOUT LONG-TERM CURRENT USE OF INSULIN (HCC): Primary | ICD-10-CM

## 2020-08-25 DIAGNOSIS — E78.00 HYPERCHOLESTEROLEMIA: ICD-10-CM

## 2020-08-25 PROCEDURE — 99442 PR PHYS/QHP TELEPHONE EVALUATION 11-20 MIN: CPT | Performed by: NURSE PRACTITIONER

## 2020-08-25 RX ORDER — GLIPIZIDE 5 MG/1
5 TABLET ORAL
Qty: 60 TAB | Refills: 0 | Status: SHIPPED | OUTPATIENT
Start: 2020-08-25 | End: 2020-10-26

## 2020-08-25 NOTE — PROGRESS NOTES
Senait Green is a 76 y.o. female evaluated via telephone at [phone] on 8/25/2020. Per patient request, I called prior to appointment time at 9:44 am to the preferred number, reached voice mail for a different person, did not leave message. 9:53 am Reached voice mail for a different person, did not leave message. 10:00 am Reached voice mail for a different person, did not leave message. 10:03 am [phone], reached Rev. Norma Bell who is on patient's permission to release information. Patient identification verified with 2 identifiers. Consent: She and/or health care decision maker has provided verbal consent to proceed: Yes Pursuant to the emergency declaration under the [address] [place] [address], [address] [place] [address] authority and the [employer] and [employer] Act, this Virtual Telephone Visit was conducted to reduce the patient's risk of exposure to COVID-19. I affirm this is a Patient Initiated Episode with a Patient who has not had a related appointment within my department in the past 7 days or scheduled within the next 24 hours. Speaks English  Documentation: I communicated the following details with the patient and/or health care decision maker, including any medical advice provided:     Assessment/Plan:       ICD-10-CM ICD-9-CM    1. Type 2 diabetes mellitus with hyperglycemia, without long-term current use of insulin (HCC)  E11.65 250.00 glipiZIDE (GLUCOTROL) 5 mg tablet     790.29 CBC WITH AUTOMATED DIFF      METABOLIC PANEL, COMPREHENSIVE      HEMOGLOBIN A1C WITH EAG      MICROALBUMIN, UR, RAND W/ MICROALB/CREAT RATIO   2. Hypercholesterolemia  E78.00 272.0 LIPID PANEL      Rev.  Norma Bell  Follow up for labs: as soon as possible, urgent, fasting  Changes/recommendations made today: discussed if blurry vision gets worse or worsening fatigue, to the ED; for now, will start medication (unknown renal function so will start glipizide) and have her return as soon as possible for labs. Medications Ordered Today   Medications    glipiZIDE (GLUCOTROL) 5 mg tablet     Sig: Take 1 Tab by mouth Daily (before lunch). Dispense:  60 Tab     Refill:  0   glipizide 5 mg 30 min ac meal    [facility]MAIN OFFICE  Subjective:   Not on any medications. Blurry vision, exhausted. Not waking up at night to urinate. Chief Complaint   Patient presents with   [facility]     Patient is asking for labs b/c last time she was diagnosed with prediabetes. Patient check her Glucose 8/20/2020 fasting it was [address] is a 76 y.o. OTHER female. HPI: The Rev states he was concerned about Ms. Grace Chen when she started having blurry vision and feeling extremely tired, symptoms he has had when his own glucoses were high. He measured her glucose and fasting it was 226. PMH: history of primary hyperparathyroidism  ROS: Positive for blurry vision; denies hypoglycemia, pain in extremities, numbness or tingling in extremities, increased frequency of urination, , weight change, chest pain, dyspnea or TIA's; polydipsia. Measuring glucoses? yes Fasting values 226; 2 hour post prandial values not available    No current outpatient medications on file prior to visit. No current facility-administered medications on file prior to visit. Objective: There were no vitals filed for this visit. No LMP recorded. Patient is postmenopausal.   Wt Readings from Last 2 Encounters:   04/20/18 172 lb 3.2 oz (78.1 kg)   12/15/17 174 lb (78.9 kg)      Hemoglobin A1c   Date Value Ref Range Status   02/16/2018 7.0 (H) 4.2 - 6.3 % Final   10/20/2017 7.6 (H) 4.2 - 6.3 % Final   A1c needs to be updated.   Lab Results   Component Value Date/Time    Cholesterol, total 307 (H) 10/20/2017 10:28 AM    HDL Cholesterol 54 10/20/2017 10:28 AM    LDL, calculated 220.8 (H) 10/20/2017 10:28 AM    Triglyceride 161 (H) 10/20/2017 10:28 AM    CHOL/HDL Ratio 5.7 (H) 10/20/2017 10:28 AM     Lab Results   Component Value Date/Time    ALT (SGPT) 35 05/05/2018 01:35 PM    Alk. phosphatase 136 (H) 05/05/2018 01:35 PM    Bilirubin, total 0.8 05/05/2018 01:35 PM     Lab Results   Component Value Date/Time    Microalbumin/Creat ratio (mg/g creat) 9 03/02/2018 10:37 AM    Microalbumin,urine random 1.48 03/02/2018 10:37 AM    Creatinine 0.61 05/05/2018 01:35 PM     Lab Results   Component Value Date/Time    GFR est AA >60 05/05/2018 01:35 PM    GFR est non-AA >60 05/05/2018 01:35 PM     Physical Exam: Not performed due to telephone call visit. Assessment/Plan:   Diagnoses and all orders for this visit:    1. Type 2 diabetes mellitus with hyperglycemia, without long-term current use of insulin (HCC)  -     glipiZIDE (GLUCOTROL) 5 mg tablet; Take 1 Tab by mouth Daily (before lunch). -     CBC WITH AUTOMATED DIFF; Future  -     METABOLIC PANEL, COMPREHENSIVE; Future  -     HEMOGLOBIN A1C WITH EAG; Future  -     MICROALBUMIN, UR, RAND W/ MICROALB/CREAT RATIO; Future    2. Hypercholesterolemia  -     LIPID PANEL; Future    Recommended purchasing glucometer with low-cost glucose strips. Marissa Gotti, DNP, FNP-BC, BC-ADM  Ani Valencia expressed understanding of this plan.

## 2020-08-25 NOTE — PROGRESS NOTES
Coordination of Care  1. Have you been to the ER, urgent care clinic since your last visit? Hospitalized since your last visit? No    2. Have you seen or consulted any other health care providers outside of the [address] [place] [address] since your last visit? Include any pap smears or colon screening. No    Does the patient need refills? NO    Learning Assessment Complete? yes  Depression Screening complete in the past 12 months? yes     0915 AM: Patient states she is at home and would like for provider to call ahead of appointment time. Patient does not have access to vital sign equipment.  Jose Allison, CMA

## 2020-10-22 DIAGNOSIS — E11.65 TYPE 2 DIABETES MELLITUS WITH HYPERGLYCEMIA, WITHOUT LONG-TERM CURRENT USE OF INSULIN (HCC): ICD-10-CM

## 2020-10-26 ENCOUNTER — TELEPHONE (OUTPATIENT)
Dept: FAMILY MEDICINE CLINIC | Age: 65
End: 2020-10-26

## 2020-10-26 RX ORDER — GLIPIZIDE 5 MG/1
TABLET ORAL
Qty: 90 TAB | Refills: 0 | Status: SHIPPED | OUTPATIENT
Start: 2020-10-26 | End: 2021-01-13

## 2020-10-26 NOTE — TELEPHONE ENCOUNTER
Returning pt call re: med refill, after chart review, provider refilled her glipizide today. Patient did not answer; nurse left voicemail that Lety Panchal was returning her call re: the medication refill to please try calling care a Alba Mariah # again [phone].

## 2020-10-27 NOTE — TELEPHONE ENCOUNTER
Was able to get into contact with the patient, patient aware that there is Glipizide refilled at the pharmacy. Pt denied having any further questions. Closing telephone encounter. Livia Bernabe.  Sarah, [address] [facility]

## 2020-11-03 NOTE — TELEPHONE ENCOUNTER
Received a faxed refill request dated 10/23/20 for refill of Glipizide 5 mg. The provider has refilled this Rx on 10/26/20. Closing this encounter for refill.  Michelle Crump RN

## 2021-01-13 ENCOUNTER — TELEPHONE (OUTPATIENT)
Dept: FAMILY MEDICINE CLINIC | Age: 66
End: 2021-01-13

## 2021-01-13 DIAGNOSIS — E78.00 HYPERCHOLESTEROLEMIA: ICD-10-CM

## 2021-01-13 DIAGNOSIS — E11.65 TYPE 2 DIABETES MELLITUS WITH HYPERGLYCEMIA, WITHOUT LONG-TERM CURRENT USE OF INSULIN (HCC): Primary | ICD-10-CM

## 2021-01-13 DIAGNOSIS — E11.65 TYPE 2 DIABETES MELLITUS WITH HYPERGLYCEMIA, WITHOUT LONG-TERM CURRENT USE OF INSULIN (HCC): ICD-10-CM

## 2021-01-13 RX ORDER — GLIPIZIDE 5 MG/1
TABLET ORAL
Qty: 90 TAB | Refills: 0 | Status: SHIPPED | OUTPATIENT
Start: 2021-01-13 | End: 2021-04-22 | Stop reason: SDUPTHER

## 2021-01-13 NOTE — PROGRESS NOTES
Request for refill received. Will provide #90 with no refills. Patient must return between 2/13/21 and 4/13/21 for labs. VV LK after labs. Diagnoses and all orders for this visit:    1. Type 2 diabetes mellitus with hyperglycemia, without long-term current use of insulin (HCC)  -     HEMOGLOBIN A1C WITH EAG; Future  -     METABOLIC PANEL, COMPREHENSIVE; Future  -     MICROALBUMIN, UR, RAND W/ MICROALB/CREAT RATIO; Future  -     CBC WITH AUTOMATED DIFF; Future    2. Hypercholesterolemia  -     LIPID PANEL;  Future

## 2021-02-16 ENCOUNTER — HOSPITAL ENCOUNTER (OUTPATIENT)
Dept: LAB | Age: 66
Discharge: HOME | End: 2021-02-16

## 2021-02-16 ENCOUNTER — LAB ONLY (OUTPATIENT)
Dept: FAMILY MEDICINE CLINIC | Age: 66
End: 2021-02-16

## 2021-02-16 DIAGNOSIS — E78.00 HYPERCHOLESTEROLEMIA: ICD-10-CM

## 2021-02-16 DIAGNOSIS — E11.65 TYPE 2 DIABETES MELLITUS WITH HYPERGLYCEMIA, WITHOUT LONG-TERM CURRENT USE OF INSULIN (HCC): ICD-10-CM

## 2021-02-16 LAB
ALBUMIN SERPL-MCNC: 4 G/DL (ref 3.5–5)
ALBUMIN/GLOB SERPL: 1 {RATIO} (ref 1.1–2.2)
ALP SERPL-CCNC: 142 U/L (ref 45–117)
ALT SERPL-CCNC: 24 U/L (ref 12–78)
ANION GAP SERPL CALC-SCNC: 5 MMOL/L (ref 5–15)
AST SERPL-CCNC: 14 U/L (ref 15–37)
BASOPHILS # BLD: 0 K/UL (ref 0–0.1)
BASOPHILS NFR BLD: 1 % (ref 0–1)
BILIRUB SERPL-MCNC: 0.5 MG/DL (ref 0.2–1)
BUN SERPL-MCNC: 9 MG/DL (ref 6–20)
BUN/CREAT SERPL: 14 (ref 12–20)
CALCIUM SERPL-MCNC: 10.8 MG/DL (ref 8.5–10.1)
CHLORIDE SERPL-SCNC: 110 MMOL/L (ref 97–108)
CHOLEST SERPL-MCNC: 305 MG/DL
CO2 SERPL-SCNC: 27 MMOL/L (ref 21–32)
CREAT SERPL-MCNC: 0.63 MG/DL (ref 0.55–1.02)
CREAT UR-MCNC: 186 MG/DL
DIFFERENTIAL METHOD BLD: ABNORMAL
EOSINOPHIL # BLD: 0.1 K/UL (ref 0–0.4)
EOSINOPHIL NFR BLD: 1 % (ref 0–7)
ERYTHROCYTE [DISTWIDTH] IN BLOOD BY AUTOMATED COUNT: 14.3 % (ref 11.5–14.5)
EST. AVERAGE GLUCOSE BLD GHB EST-MCNC: 143 MG/DL
GLOBULIN SER CALC-MCNC: 3.9 G/DL (ref 2–4)
GLUCOSE SERPL-MCNC: 129 MG/DL (ref 65–100)
HBA1C MFR BLD: 6.6 % (ref 4–5.6)
HCT VFR BLD AUTO: 41.4 % (ref 35–47)
HDLC SERPL-MCNC: 67 MG/DL
HDLC SERPL: 4.6 {RATIO} (ref 0–5)
HGB BLD-MCNC: 13 G/DL (ref 11.5–16)
IMM GRANULOCYTES # BLD AUTO: 0 K/UL (ref 0–0.04)
IMM GRANULOCYTES NFR BLD AUTO: 1 % (ref 0–0.5)
LDLC SERPL CALC-MCNC: 217.2 MG/DL (ref 0–100)
LIPID PROFILE,FLP: ABNORMAL
LYMPHOCYTES # BLD: 1.4 K/UL (ref 0.8–3.5)
LYMPHOCYTES NFR BLD: 34 % (ref 12–49)
MCH RBC QN AUTO: 25.6 PG (ref 26–34)
MCHC RBC AUTO-ENTMCNC: 31.4 G/DL (ref 30–36.5)
MCV RBC AUTO: 81.7 FL (ref 80–99)
MICROALBUMIN UR-MCNC: 1.59 MG/DL
MICROALBUMIN/CREAT UR-RTO: 9 MG/G (ref 0–30)
MONOCYTES # BLD: 0.4 K/UL (ref 0–1)
MONOCYTES NFR BLD: 8 % (ref 5–13)
NEUTS SEG # BLD: 2.4 K/UL (ref 1.8–8)
NEUTS SEG NFR BLD: 55 % (ref 32–75)
NRBC # BLD: 0 K/UL (ref 0–0.01)
NRBC BLD-RTO: 0 PER 100 WBC
PLATELET # BLD AUTO: 210 K/UL (ref 150–400)
PMV BLD AUTO: 12.7 FL (ref 8.9–12.9)
POTASSIUM SERPL-SCNC: 4 MMOL/L (ref 3.5–5.1)
PROT SERPL-MCNC: 7.9 G/DL (ref 6.4–8.2)
RBC # BLD AUTO: 5.07 M/UL (ref 3.8–5.2)
SODIUM SERPL-SCNC: 142 MMOL/L (ref 136–145)
TRIGL SERPL-MCNC: 104 MG/DL (ref ?–150)
VLDLC SERPL CALC-MCNC: 20.8 MG/DL
WBC # BLD AUTO: 4.2 K/UL (ref 3.6–11)

## 2021-02-16 PROCEDURE — 83036 HEMOGLOBIN GLYCOSYLATED A1C: CPT

## 2021-02-16 PROCEDURE — 82043 UR ALBUMIN QUANTITATIVE: CPT

## 2021-02-16 PROCEDURE — 85025 COMPLETE CBC W/AUTO DIFF WBC: CPT

## 2021-02-16 PROCEDURE — 80053 COMPREHEN METABOLIC PANEL: CPT

## 2021-02-16 PROCEDURE — 80061 LIPID PANEL: CPT

## 2021-02-16 NOTE — PROGRESS NOTES
Patient was here for labs only: CBC, A1C, Lipid, CMP and Microalbumin were drawn and collected by MT, on LA, with no complications, as per Denita Kaufman NP

## 2021-02-17 NOTE — PROGRESS NOTES
Calcium level is elevated. HbA1c 6.6, consistent with diabetes. Cholesterol is extremely high with total cholesterol > 300 and LDL cholesterol > 200. Guidelines recommend statin. Normal microalb/creat ratio. Normal cbc. Has appointment 2/23 to discuss.

## 2021-02-22 NOTE — PROGRESS NOTES
Speaks English. Patient identification verified with 2 identifiers. Patient offered video visit and declined. Consent: She and/or health care decision maker has provided verbal consent to proceed: Yes   Total Time: minutes: 11-20 minutes  Pursuant to the emergency declaration under the [address] [place], [facility] authority and the [employer] Act, this Virtual Telephone Visit was conducted to reduce the patient's risk of exposure to COVID-19. Assessment:   Diagnoses and all orders for this visit:    1. Hypercholesterolemia  -     atorvastatin (LIPITOR) 20 mg tablet; Take 1 Tab by mouth daily. For cholesterol.  -     REFERRAL TO DIETITIAN  -     TSH 3RD GENERATION; Future  -     LIPID PANEL; Future    2. Type 2 diabetes mellitus with hyperglycemia, without long-term current use of insulin (HCC)  -     REFERRAL TO DIETITIAN  -     HEMOGLOBIN A1C WITH EAG; Future      Plan:  I communicated with the patient and/or health care decision maker about the following: Follow-up and Dispositions    · Return for nutrition when available; LK F2F 3 months with fasting labs 2 weeks before.     -mammogram and pap smear, see if eligible at [facility]. She is not taking any vitamin or mineral supplements.  -cholesterol extremely high, see nutrition and start statin. Anticipatory guidance provided. -diabetes confirmed with a1c 6.6. Continue glipizide 5 mg, see nutrition. Sánchez Whatley is a 78 y.o. female evaluated via telephone on 2/23/2021. Chief Complaint   Patient presents with    Follow-up     diabetes    -nutrition  -[facility] phone number given. To call us back if she does not qualify. History of Present Illness I started glipizide 5 mg due to fasting glucose 226. Elevated calcium - has she had mammogram and pap smear? None.  Has never had pap smear or mammogram. Cholesterol extremely high with total chol > 300 and LDL > 200, should be on a statin. Diabetes confirmed with A1c 6.6. Normal microalb/creat. Normal cbc with diff. She went to [facility]. Asking about the bill. I asked her to call the phone number on the bill and ask about the Care Card. No physical exam performed due to telephone visit. I affirm this is a Patient Initiated Episode with a Patient who has not had a related appointment within my department in the past 7 days or scheduled within the next 24 hours. Reginaldo Kendall, LOUISE Howell expressed understanding and agreed to this plan.

## 2021-02-23 ENCOUNTER — VIRTUAL VISIT (OUTPATIENT)
Dept: FAMILY MEDICINE CLINIC | Age: 66
End: 2021-02-23

## 2021-02-23 DIAGNOSIS — E78.00 HYPERCHOLESTEROLEMIA: Primary | ICD-10-CM

## 2021-02-23 DIAGNOSIS — E11.65 TYPE 2 DIABETES MELLITUS WITH HYPERGLYCEMIA, WITHOUT LONG-TERM CURRENT USE OF INSULIN (HCC): ICD-10-CM

## 2021-02-23 PROCEDURE — 99442 PR PHYS/QHP TELEPHONE EVALUATION 11-20 MIN: CPT | Performed by: NURSE PRACTITIONER

## 2021-02-23 RX ORDER — ATORVASTATIN CALCIUM 20 MG/1
20 TABLET, FILM COATED ORAL DAILY
Qty: 30 TAB | Refills: 3 | Status: SHIPPED | OUTPATIENT
Start: 2021-02-23 | End: 2021-04-22 | Stop reason: SDUPTHER

## 2021-02-23 NOTE — PROGRESS NOTES
Coordination of Care  1. Have you been to the ER, urgent care clinic since your last visit? Hospitalized since your last visit? No    2. Have you seen or consulted any other health care providers outside of the [address] [place] [address] since your last visit? Include any pap smears or colon screening. No    Does the patient need refills? NO    Learning Assessment Complete?  yes  Depression Screening complete in the past 12 months? yes     Pt has not vitals signs available

## 2021-02-25 ENCOUNTER — VIRTUAL VISIT (OUTPATIENT)
Dept: FAMILY MEDICINE CLINIC | Age: 66
End: 2021-02-25

## 2021-02-25 DIAGNOSIS — Z71.3 DIETARY COUNSELING AND SURVEILLANCE: Primary | ICD-10-CM

## 2021-02-25 PROCEDURE — 97802 MEDICAL NUTRITION INDIV IN: CPT

## 2021-02-25 NOTE — PROGRESS NOTES
Asia Palacios consented to telephonic/virtual nutrition consultation. Video is declined. Pt identity verified using two identifiers. DATE: 2021      REFERRING PHYSICIAN: Kathy Gonzalez  NAME: Asia Palacios : 1955 AGE: 78 y.o. GENDER: female  REASON FOR VISIT: High cholesterol, T2DM    ASSESSMENT:  Past Medical Hx: T2DM      LABS:   Lab Results   Component Value Date/Time    Hemoglobin A1c 6.6 (H) 2021 10:41 AM       MEDICATIONS/SUPPLEMENTS:     Prior to Admission medications    Medication Sig Start Date End Date Taking? Authorizing Provider   atorvastatin (LIPITOR) 20 mg tablet Take 1 Tab by mouth daily. For cholesterol. 21   Dustin Perry NP   glipiZIDE (GLUCOTROL) 5 mg tablet TAKE 1 TABLET BY MOUTH ONCE DAILY BEFORE  LUNCH 21   Dustin Perry NP       FOOD ALLERGIES/INTOLERANCES: NA    ANTHROPOMETRICS:    Ht Readings from Last 1 Encounters:   18 4' 11.65\" (1.515 m)      Wt Readings from Last 1 Encounters:   18 172 lb 3.2 oz (78.1 kg)        Reported Wt Hx: Has started to gain weight per patient  Estimated Nutritional Needs:     Reported Diet Hx:  Eat less oil, fatty food, no added sugar in tea   24 Hour Diet Recall  Breakfast  Soup \"eggo\" (fish)   Lunch  Soup   Dinner     Snacks     Beverages  water     Exercise/Physical Actvity:  Walk distance on nice weather days, sometimes 4 hours      Environmental/Social:    Daughter and self share cooking, daughter does food shopping  Checking BG before meal in AM and after meal in PM  \"I snack a lot throughout the day. \"    NUTRITION INTERVENTION:  Introduced MyPlate and discussed how it can help to ensure both balance and variety. Reviewed the food groups and what each group contributes to our bodies. Emphasized that balancing meals will help with satiety. RD discussed the role of fiber in a healthy diet. RD emailed handouts on high fiber food list, T2DM therapy and healthy snack combinations.          PATIENT GOALS:  1) add vegetable to at least 1 meal/day  2) Make smoothie using combination of berries/fruit and vegetables    Specific tips and techniques to facilitate compliance with above recommendations were provided and discussed. Pt was strongly encouraged to begin making necessary changes now, and re-visit the dietitian prkevin.             Hudson Lim, RD

## 2021-03-25 ENCOUNTER — VIRTUAL VISIT (OUTPATIENT)
Dept: FAMILY MEDICINE CLINIC | Age: 66
End: 2021-03-25

## 2021-03-25 DIAGNOSIS — Z71.3 DIETARY COUNSELING AND SURVEILLANCE: Primary | ICD-10-CM

## 2021-03-25 PROCEDURE — 97803 MED NUTRITION INDIV SUBSEQ: CPT

## 2021-03-25 NOTE — PROGRESS NOTES
Dionne Beck consented to telephonic/virtual nutrition consultation. Video is declined. Pt identified using  and full name    F/U T2DM nutrition consultation    No current weight (virtual appt)     Walking 2 days/week  Changes to Diet: eating broccoli, peas  Smoothie (fruit)     RD reinforced nutrition information from previous appt. Gave time to pt to ask questions. Interested in recipe ideas. RD reviewed and gave some ideas. RD provided support for changes made and encouraged pt to keep going.

## 2021-04-22 DIAGNOSIS — E11.65 TYPE 2 DIABETES MELLITUS WITH HYPERGLYCEMIA, WITHOUT LONG-TERM CURRENT USE OF INSULIN (HCC): ICD-10-CM

## 2021-04-22 DIAGNOSIS — E78.00 HYPERCHOLESTEROLEMIA: ICD-10-CM

## 2021-04-22 RX ORDER — ATORVASTATIN CALCIUM 20 MG/1
20 TABLET, FILM COATED ORAL DAILY
Qty: 30 TAB | Refills: 0 | Status: SHIPPED | OUTPATIENT
Start: 2021-04-22 | End: 2021-06-18

## 2021-04-22 RX ORDER — GLIPIZIDE 5 MG/1
TABLET ORAL
Qty: 90 TAB | Refills: 0 | Status: SHIPPED | OUTPATIENT
Start: 2021-04-22 | End: 2021-06-18 | Stop reason: SDUPTHER

## 2021-04-22 NOTE — TELEPHONE ENCOUNTER
Tc from the pt. She had left the message that she would like to change her Pharmacy to the [facility] on [address]. She is now with her Dtr in [place], near the [address] [facility]. She can walk there. The chart was reviewed and the pt will need refills on her medication's. She has 1 refill left of the Atorvastatin and 0 refills left on the Glipizide. The one refill was of the Atorvastatin was sent to the [facility] on [address] [facility]. The refill request for the Glipizide needing more refills was sent to the provider. This message was routed to the provider. I will update her Pharmacy in her chart to [facility] on [address]. Provider please switch to  the [facility] to [address] before ordering the Glipizide.   Marichuy Anthony RN

## 2021-06-08 ENCOUNTER — LAB ONLY (OUTPATIENT)
Dept: FAMILY MEDICINE CLINIC | Age: 66
End: 2021-06-08

## 2021-06-08 ENCOUNTER — HOSPITAL ENCOUNTER (OUTPATIENT)
Dept: LAB | Age: 66
Discharge: HOME | End: 2021-06-08

## 2021-06-08 DIAGNOSIS — E11.65 TYPE 2 DIABETES MELLITUS WITH HYPERGLYCEMIA, WITHOUT LONG-TERM CURRENT USE OF INSULIN (HCC): ICD-10-CM

## 2021-06-08 DIAGNOSIS — E78.00 HYPERCHOLESTEROLEMIA: ICD-10-CM

## 2021-06-08 LAB
CHOLEST SERPL-MCNC: 247 MG/DL
EST. AVERAGE GLUCOSE BLD GHB EST-MCNC: 146 MG/DL
HBA1C MFR BLD: 6.7 % (ref 4–5.6)
HDLC SERPL-MCNC: 62 MG/DL
HDLC SERPL: 4 {RATIO} (ref 0–5)
LDLC SERPL CALC-MCNC: 161.6 MG/DL (ref 0–100)
TRIGL SERPL-MCNC: 117 MG/DL (ref ?–150)
TSH SERPL DL<=0.05 MIU/L-ACNC: 1.91 UIU/ML (ref 0.36–3.74)
VLDLC SERPL CALC-MCNC: 23.4 MG/DL

## 2021-06-08 PROCEDURE — 84443 ASSAY THYROID STIM HORMONE: CPT

## 2021-06-08 PROCEDURE — 83036 HEMOGLOBIN GLYCOSYLATED A1C: CPT

## 2021-06-08 PROCEDURE — 80061 LIPID PANEL: CPT

## 2021-06-09 NOTE — PROGRESS NOTES
Cholesterol has improved some. Current dose of statin is not adequate. Normal thyroid testing. Diabetes hemoglobin A1c is slightly higher at 6.7, at goal < 7.

## 2021-06-15 NOTE — PROGRESS NOTES
2021 speaks [address] (: 1955) is a 78 y.o. female, established patient, here for evaluation of the following chief complaint(s): diabetes, high cholesterol. ASSESSMENT/PLAN:  Below is the assessment and plan developed based on review of pertinent history, physical exam, labs, studies, and medications. Atorvastatin increased to 40 mg daily. 1. Hypercholesterolemia  -     atorvastatin (LIPITOR) 40 mg tablet; Take 1 Tablet by mouth daily. For cholesterol., Normal, Disp-90 Tablet, R-1  2. Type 2 diabetes mellitus with hyperglycemia, without long-term current use of insulin (HCC)  -     glipiZIDE (GLUCOTROL) 5 mg tablet; TAKE 1 TABLET BY MOUTH ONCE DAILY BEFORE  LUNCH, Normal, Disp-90 Tablet, R-1  -     HM DIABETES FOOT EXAM  3. Elevated blood pressure reading    Return for Fasting labs 2 months, then F2F LK (bp, fasting labs). SUBJECTIVE/OBJECTIVE:  HPI Out of atorvastatin for over a week. No results found for any visits on 21. Lab Results   Component Value Date/Time    Hemoglobin A1c 6.7 (H) 2021 01:25 PM    Hemoglobin A1c 6.6 (H) 2021 10:41 AM    Hemoglobin A1c 7.0 (H) 2018 02:17 PM     Traveling to [place] tomorrow to see romina, returning in July. Review of Systems: Negative for: fever, chest pain, shortness of breath, leg swelling. Social History:  reports that she has never smoked. She has never used smokeless tobacco. She reports that she does not drink alcohol and does not use drugs. Current Medications:   Current Outpatient Medications   Medication Sig    atorvastatin (LIPITOR) 20 mg tablet Take 1 Tablet by mouth daily. For cholesterol.     glipiZIDE (GLUCOTROL) 5 mg tablet TAKE 1 TABLET BY MOUTH ONCE DAILY BEFORE  LUNCH     Physical Examination:   Vitals:    21 1351 21 1358   BP: (!) 145/91 (!) 144/81   Pulse: 80    Temp: 97.7 °F (36.5 °C)    TempSrc: Skin    SpO2: 99%    Weight: 166 lb 12.8 oz (75.7 kg)    Height: 4' 11.84\" (1.52 m)      General appearance - well developed, no acute distress. Chest - clear to auscultation. Heart - regular rate and rhythm without murmurs, rubs, or gallops. Abdomen - bowel sounds present x 4, NT, ND  Extremities - distal sensation intact, no CCE. Diabetes foot exam performed by Olga Lidia Bueno NP     Measurement  Response Comments   Monofilament  R - normal sensation with micro filament  L - normal sensation with micro filament    Pulse DP R - 2+ (normal)  L - 2+ (normal)    Pulse TP R - 2+ (normal)  L - 2+ (normal)    Structural deformity R - None  L - None    Skin Integrity / Deformity R - None  L - None         An electronic signature was used to authenticate this note.   -- Olga Lidia Bueno, NP

## 2021-06-18 ENCOUNTER — OFFICE VISIT (OUTPATIENT)
Dept: FAMILY MEDICINE CLINIC | Age: 66
End: 2021-06-18

## 2021-06-18 VITALS
SYSTOLIC BLOOD PRESSURE: 144 MMHG | HEIGHT: 60 IN | DIASTOLIC BLOOD PRESSURE: 81 MMHG | HEART RATE: 80 BPM | OXYGEN SATURATION: 99 % | BODY MASS INDEX: 32.75 KG/M2 | TEMPERATURE: 97.7 F | WEIGHT: 166.8 LBS

## 2021-06-18 DIAGNOSIS — E78.00 HYPERCHOLESTEROLEMIA: Primary | ICD-10-CM

## 2021-06-18 DIAGNOSIS — R03.0 ELEVATED BLOOD PRESSURE READING: ICD-10-CM

## 2021-06-18 DIAGNOSIS — E11.65 TYPE 2 DIABETES MELLITUS WITH HYPERGLYCEMIA, WITHOUT LONG-TERM CURRENT USE OF INSULIN (HCC): ICD-10-CM

## 2021-06-18 PROCEDURE — 99214 OFFICE O/P EST MOD 30 MIN: CPT | Performed by: NURSE PRACTITIONER

## 2021-06-18 RX ORDER — ATORVASTATIN CALCIUM 40 MG/1
40 TABLET, FILM COATED ORAL DAILY
Qty: 90 TABLET | Refills: 1 | Status: SHIPPED | OUTPATIENT
Start: 2021-06-18 | End: 2021-12-23 | Stop reason: SDUPTHER

## 2021-06-18 RX ORDER — GLIPIZIDE 5 MG/1
TABLET ORAL
Qty: 90 TABLET | Refills: 1 | Status: SHIPPED | OUTPATIENT
Start: 2021-06-18 | End: 2021-12-23 | Stop reason: SDUPTHER

## 2021-06-18 NOTE — PROGRESS NOTES
An After Visit Summary was printed and reviewed with the patient. Patient given witting prescriptions.   Daina Baker

## 2021-12-03 ENCOUNTER — HOSPITAL ENCOUNTER (OUTPATIENT)
Dept: LAB | Age: 66
Discharge: HOME | End: 2021-12-03

## 2021-12-03 ENCOUNTER — LAB ONLY (OUTPATIENT)
Dept: FAMILY MEDICINE CLINIC | Age: 66
End: 2021-12-03

## 2021-12-03 DIAGNOSIS — E78.00 HYPERCHOLESTEROLEMIA: Primary | ICD-10-CM

## 2021-12-03 DIAGNOSIS — E78.00 HYPERCHOLESTEROLEMIA: ICD-10-CM

## 2021-12-03 LAB
CHOLEST SERPL-MCNC: 135 MG/DL
EST. AVERAGE GLUCOSE BLD GHB EST-MCNC: 148 MG/DL
HBA1C MFR BLD: 6.8 % (ref 4–5.6)
HDLC SERPL-MCNC: 54 MG/DL
HDLC SERPL: 2.5 {RATIO} (ref 0–5)
LDLC SERPL CALC-MCNC: 68 MG/DL (ref 0–100)
TRIGL SERPL-MCNC: 65 MG/DL (ref ?–150)
TSH SERPL DL<=0.05 MIU/L-ACNC: 2.46 UIU/ML (ref 0.36–3.74)
VLDLC SERPL CALC-MCNC: 13 MG/DL

## 2021-12-03 PROCEDURE — 80061 LIPID PANEL: CPT

## 2021-12-03 PROCEDURE — 83036 HEMOGLOBIN GLYCOSYLATED A1C: CPT

## 2021-12-03 PROCEDURE — 84443 ASSAY THYROID STIM HORMONE: CPT

## 2021-12-23 ENCOUNTER — VIRTUAL VISIT (OUTPATIENT)
Dept: FAMILY MEDICINE CLINIC | Age: 66
End: 2021-12-23

## 2021-12-23 DIAGNOSIS — E78.00 HYPERCHOLESTEROLEMIA: ICD-10-CM

## 2021-12-23 DIAGNOSIS — E11.65 TYPE 2 DIABETES MELLITUS WITH HYPERGLYCEMIA, WITHOUT LONG-TERM CURRENT USE OF INSULIN (HCC): ICD-10-CM

## 2021-12-23 PROCEDURE — 99441 PR PHYS/QHP TELEPHONE EVALUATION 5-10 MIN: CPT | Performed by: NURSE PRACTITIONER

## 2021-12-23 RX ORDER — GLIPIZIDE 5 MG/1
TABLET ORAL
Qty: 90 TABLET | Refills: 1 | Status: SHIPPED | OUTPATIENT
Start: 2021-12-23 | End: 2022-07-25

## 2021-12-23 RX ORDER — ATORVASTATIN CALCIUM 40 MG/1
40 TABLET, FILM COATED ORAL DAILY
Qty: 90 TABLET | Refills: 1 | Status: SHIPPED | OUTPATIENT
Start: 2021-12-23 | End: 2022-07-20 | Stop reason: SDUPTHER

## 2021-12-23 NOTE — PROGRESS NOTES
Coordination of Care  1. Have you been to the ER, urgent care clinic since your last visit? Hospitalized since your last visit? No    2. Have you seen or consulted any other health care providers outside of the [address] [place] [address] since your last visit? Include any pap smears or colon screening. No    Does the patient need refills? YES    Learning Assessment Complete?  yes  Depression Screening complete in the past 12 months? yes

## 2021-12-23 NOTE — PROGRESS NOTES
Speaks English. Patient identification verified with 2 identifiers. Consent: She and/or health care decision maker has provided verbal consent to proceed: Yes   Total Time: minutes: 5-10 minutes  Pursuant to the emergency declaration under the [address] [facility], [address] [place] [address] and the [employer] Act, this Virtual Telephone Visit was conducted to reduce the patient's risk of exposure to COVID-19. Assessment/Plan:   Diagnoses and all orders for this visit:    1. Hypercholesterolemia  -     atorvastatin (LIPITOR) 40 mg tablet; Take 1 Tablet by mouth daily. For cholesterol. 2. Type 2 diabetes mellitus with hyperglycemia, without long-term current use of insulin (HCC)  -     glipiZIDE (GLUCOTROL) 5 mg tablet; TAKE 1 TABLET BY MOUTH ONCE DAILY BEFORE  LUNCH            Subjective:   Maren Barragan is a 78 y.o. female evaluated via telephone on 12/23/2021. Chief Complaint   Patient presents with    Diabetes     f/u, med refill      History of Present Illness  Feeling well. No symptoms of hypoglycemia. Review of Systems   General: No fever. Eyes: No blurry or double vision. Cardiovascular: No chest pain, dyspnea, or TIAs. Endocrine: No polydipsia or polyphagia. No weight loss. No hypoglycemic symptoms. Respiratory: No shortness of breath. Musculoskeletal: No myalgias. Genitourinary: No polyuria. Neurological: No numbness/tingling/pain in extremities. Extremities: No swelling.     Objective:     Latest Lab Result Choices discussed today:   Lab Results   Component Value Date/Time    Hemoglobin A1c 6.8 (H) 12/03/2021 11:10 AM    Hemoglobin A1c 6.7 (H) 06/08/2021 01:25 PM    Hemoglobin A1c 6.6 (H) 02/16/2021 10:41 AM    Glucose 129 (H) 02/16/2021 10:41 AM    Glucose POC f 102 04/20/2018 02:11 PM    Microalbumin/Creat ratio (mg/g creat) 9 02/16/2021 10:41 AM    Microalbumin,urine random 1.59 02/16/2021 10:41 AM LDL, calculated 68 12/03/2021 11:10 AM    Creatinine 0.63 02/16/2021 10:41 AM      Current Outpatient Medications   Medication Sig    atorvastatin (LIPITOR) 40 mg tablet Take 1 Tablet by mouth daily. For cholesterol.  glipiZIDE (GLUCOTROL) 5 mg tablet TAKE 1 TABLET BY MOUTH ONCE DAILY BEFORE  LUNCH     No current facility-administered medications for this visit. No physical exam performed due to telephone visit. I affirm this is a Patient Initiated Episode with a Patient who has not had a related appointment within my department in the past 7 days or scheduled within the next 24 hours. LOUISE To expressed understanding and agreed to this plan.

## 2022-03-19 PROBLEM — E11.9 DM TYPE 2, GOAL HBA1C < 7% (HCC): Status: ACTIVE | Noted: 2017-10-23

## 2022-07-20 DIAGNOSIS — E78.00 HYPERCHOLESTEROLEMIA: ICD-10-CM

## 2022-07-20 DIAGNOSIS — E11.65 TYPE 2 DIABETES MELLITUS WITH HYPERGLYCEMIA, WITHOUT LONG-TERM CURRENT USE OF INSULIN (HCC): ICD-10-CM

## 2022-07-20 NOTE — TELEPHONE ENCOUNTER
Tc from the pt a message was left on the cbn phone that she needed a refill on her medicine. The pt was called and asked her name and . The pt stated she needs her DM, and Cholesterol medicines refilled. The pt was informed she should call and request her refills from her Pharmacy ask them to fax the CAV a refill request.  The pt stated she did not know the pharmacy phone #. The pt was informed the name and telephone number of her Pharmacy was on the medication bottles, and the Rx #. The pt's chart was reviewed. Her last visit was a virtual appt on 21. The providers note said return in 5 month's for non-fasting A1c. The pt was informed she will need this lab, but I would send the message for refill request to the provider and she should call this evening or tomorrow to the Pharmacy and check on the medication refill. A message was sent to the front office to schedule the pt a non-fasting lab appt.  Kate Duncan RN

## 2022-07-21 ENCOUNTER — TELEPHONE (OUTPATIENT)
Dept: FAMILY MEDICINE CLINIC | Age: 67
End: 2022-07-21

## 2022-07-25 RX ORDER — ATORVASTATIN CALCIUM 40 MG/1
TABLET, FILM COATED ORAL
Qty: 90 TABLET | Refills: 0 | Status: SHIPPED | OUTPATIENT
Start: 2022-07-25

## 2022-07-25 RX ORDER — ATORVASTATIN CALCIUM 40 MG/1
40 TABLET, FILM COATED ORAL DAILY
Qty: 90 TABLET | Refills: 0 | Status: SHIPPED | OUTPATIENT
Start: 2022-07-25

## 2022-07-25 RX ORDER — GLIPIZIDE 5 MG/1
TABLET ORAL
Qty: 90 TABLET | Refills: 0 | Status: SHIPPED | OUTPATIENT
Start: 2022-07-25

## 2023-05-20 RX ORDER — ATORVASTATIN CALCIUM 40 MG/1
TABLET, FILM COATED ORAL
COMMUNITY
Start: 2022-07-25

## 2023-05-20 RX ORDER — GLIPIZIDE 5 MG/1
TABLET ORAL
COMMUNITY
Start: 2022-07-25